# Patient Record
Sex: FEMALE | Employment: UNEMPLOYED | URBAN - METROPOLITAN AREA
[De-identification: names, ages, dates, MRNs, and addresses within clinical notes are randomized per-mention and may not be internally consistent; named-entity substitution may affect disease eponyms.]

---

## 2020-09-16 NOTE — PROGRESS NOTES
Date of Service: 9/23/2020    Chief Complaint   Patient presents with     Consult     BL foot pain and burning         HPI: Donna is a 60 year old female who presents today for further evaluation of BL foot pain.  Nature: sharp/burning/numbness/tingling    Location: top and bottom of both feet.     Duration: months    Onset: gradual. No inciting trauma.     Course: waxes and wanes.     Aggravating/alleviating factors: Some positions can cause pain in the feet.  She relates that lying down does cause pain.    Previous Treatments: None.  She relates that in 2014, she was diagnosed with diabetes.  She relates that she start on metformin, but stopped this a few years ago.  She relates that her  is a type I diabetic and takes his blood sugar often.  She relates that she tested hers about a year ago, and noticed it was high.  She has talked her primary doctor about getting back on the metformin, which is happened.    Review of Systems: No nausea, vomiting, diarrhea, fever, chills, night sweats, shortness of breath, chest pain.    PMH: No past medical history on file.    PSxH: No past surgical history on file.    Allergies: Patient has no allergy information on record.    SH:   Social History     Socioeconomic History     Marital status:      Spouse name: Not on file     Number of children: Not on file     Years of education: Not on file     Highest education level: Not on file   Occupational History     Not on file   Social Needs     Financial resource strain: Not on file     Food insecurity     Worry: Not on file     Inability: Not on file     Transportation needs     Medical: Not on file     Non-medical: Not on file   Tobacco Use     Smoking status: Not on file   Substance and Sexual Activity     Alcohol use: Not on file     Drug use: Not on file     Sexual activity: Not on file   Lifestyle     Physical activity     Days per week: Not on file     Minutes per session: Not on file     Stress: Not on file    Relationships     Social connections     Talks on phone: Not on file     Gets together: Not on file     Attends Muslim service: Not on file     Active member of club or organization: Not on file     Attends meetings of clubs or organizations: Not on file     Relationship status: Not on file     Intimate partner violence     Fear of current or ex partner: Not on file     Emotionally abused: Not on file     Physically abused: Not on file     Forced sexual activity: Not on file   Other Topics Concern     Not on file   Social History Narrative     Not on file       FH: No family history on file.    Objective:  Data Unavailable Data Unavailable Data Unavailable Data Unavailable Data Unavailable 0 lbs 0 oz    PT and DP pulses are 1/4 bilaterally. CRT is within normal limits.  Diminished pedal hair.   Gross sensation is intact bilaterally.  Protective sensation is intact as demonstrated with a 5.07G monofilament.  Equinus is noted bilaterally. No pain with active or passive ROM of the ankle, MTJ, 1st ray, or halluces bilaterally,.  Severe pes planus noted bilaterally.  Positive too many toe signs noted.  Calcanei are everted bilaterally.  No areas of pressure noted to the plantar foot.  No pain noted with palpation of the foot globally.  No pain with active or passive range of motion of the feet.  Muscle testing is 5/5.  Negative straight leg test on the left.  Positive straight leg test with tingling in the hallux on the right.  Negative Tinel's of  sign with percussion of the tibial and common peroneal nerves bilaterally.  Nails normal bilaterally. No open lesions are noted.     bilateral foot xrays indicated in 6 weightbearing views.    There appears to be some cortical thickening and small areas on the medial aspect of the left second metatarsal.  This could be from increased stress in the area.  Atavistic navicular noted bilaterally.      Assessment: Donna is a 59 YO with DM2 with paresthesia in BL feet. I  think this is more like related to radiculopathy, as she does have a +SLT on the right and lumbar spine changes. I would recommend treatment for the radiculopathy initially and see how she does. If no or minimal improvement, I would recommend an EMG.     Plan:  - Pt seen and evaluated.  - XRs taken and discussed with her.  - Order for PT for the paresthesia.   - Referral to sports med for the spine.  - See again in 6 weeks.

## 2020-09-23 ENCOUNTER — ANCILLARY PROCEDURE (OUTPATIENT)
Dept: GENERAL RADIOLOGY | Facility: CLINIC | Age: 60
End: 2020-09-23
Attending: PODIATRIST
Payer: COMMERCIAL

## 2020-09-23 ENCOUNTER — OFFICE VISIT (OUTPATIENT)
Dept: PODIATRY | Facility: CLINIC | Age: 60
End: 2020-09-23
Payer: COMMERCIAL

## 2020-09-23 VITALS — SYSTOLIC BLOOD PRESSURE: 123 MMHG | DIASTOLIC BLOOD PRESSURE: 84 MMHG | WEIGHT: 165.57 LBS | HEART RATE: 98 BPM

## 2020-09-23 DIAGNOSIS — R20.2 PARESTHESIA OF FOOT, BILATERAL: ICD-10-CM

## 2020-09-23 DIAGNOSIS — R20.2 PARESTHESIA OF FOOT, BILATERAL: Primary | ICD-10-CM

## 2020-09-23 PROCEDURE — 72100 X-RAY EXAM L-S SPINE 2/3 VWS: CPT | Performed by: RADIOLOGY

## 2020-09-23 PROCEDURE — 99203 OFFICE O/P NEW LOW 30 MIN: CPT | Performed by: PODIATRIST

## 2020-09-23 PROCEDURE — 73630 X-RAY EXAM OF FOOT: CPT | Mod: RT | Performed by: RADIOLOGY

## 2020-09-23 NOTE — PATIENT INSTRUCTIONS
Thanks for coming today.  Ortho/Sports Medicine Clinic  [address] [place] [address]    To schedule future appointments in Ortho Clinic, you may call [phone].    To schedule ordered imaging by your provider:   Call Central Imaging [phone]    To schedule an injection ordered by your provider:  Call Central Imaging Injection scheduling line: [phone]  [facility] available online at:  [email or web address]    Please call if any further questions or concerns ([phone]).  Clinic hours 8 am to 5 pm.    Return to clinic (call) if symptoms worsen or fail to improve.

## 2020-09-23 NOTE — LETTER
9/23/2020         RE: Donna Rubi  [address]  [facility] [address]        Dear Colleague,    Thank you for referring your patient, Donna Rubi, to the [facility]. Please see a copy of my visit note below.    Date of Service: 9/23/2020    Chief Complaint   Patient presents with     Consult     BL foot pain and burning         HPI: Donna is a 60 year old female who presents today for further evaluation of BL foot pain.  Nature: sharp/burning/numbness/tingling    Location: top and bottom of both feet.     Duration: months    Onset: gradual. No inciting trauma.     Course: waxes and wanes.     Aggravating/alleviating factors: Some positions can cause pain in the feet.  She relates that lying down does cause pain.    Previous Treatments: None.  She relates that in 2014, she was diagnosed with diabetes.  She relates that she start on metformin, but stopped this a few years ago.  She relates that her  is a type I diabetic and takes his blood sugar often.  She relates that she tested hers about a year ago, and noticed it was high.  She has talked her primary doctor about getting back on the metformin, which is happened.    Review of Systems: No nausea, vomiting, diarrhea, fever, chills, night sweats, shortness of breath, chest pain.    PMH: No past medical history on file.    PSxH: No past surgical history on file.    Allergies: Patient has no allergy information on record.    SH:   Social History     Socioeconomic History     Marital status:      Spouse name: Not on file     Number of children: Not on file     Years of education: Not on file     Highest education level: Not on file   Occupational History     Not on file   Social Needs     Financial resource strain: Not on file     Food insecurity     Worry: Not on file     Inability: Not on file     Transportation needs     Medical: Not on file     Non-medical: Not on file   Tobacco Use     Smoking status: Not on file    Substance and Sexual Activity     Alcohol use: Not on file     Drug use: Not on file     Sexual activity: Not on file   Lifestyle     Physical activity     Days per week: Not on file     Minutes per session: Not on file     Stress: Not on file   Relationships     Social connections     Talks on phone: Not on file     Gets together: Not on file     Attends Sabianism service: Not on file     Active member of club or organization: Not on file     Attends meetings of clubs or organizations: Not on file     Relationship status: Not on file     Intimate partner violence     Fear of current or ex partner: Not on file     Emotionally abused: Not on file     Physically abused: Not on file     Forced sexual activity: Not on file   Other Topics Concern     Not on file   Social History Narrative     Not on file       FH: No family history on file.    Objective:  Data Unavailable Data Unavailable Data Unavailable Data Unavailable Data Unavailable 0 lbs 0 oz    PT and DP pulses are 1/4 bilaterally. CRT is within normal limits.  Diminished pedal hair.   Gross sensation is intact bilaterally.  Protective sensation is intact as demonstrated with a 5.07G monofilament.  Equinus is noted bilaterally. No pain with active or passive ROM of the ankle, MTJ, 1st ray, or halluces bilaterally,.  Severe pes planus noted bilaterally.  Positive too many toe signs noted.  Calcanei are everted bilaterally.  No areas of pressure noted to the plantar foot.  No pain noted with palpation of the foot globally.  No pain with active or passive range of motion of the feet.  Muscle testing is 5/5.  Negative straight leg test on the left.  Positive straight leg test with tingling in the hallux on the right.  Negative Tinel's of  sign with percussion of the tibial and common peroneal nerves bilaterally.  Nails normal bilaterally. No open lesions are noted.     bilateral foot xrays indicated in 6 weightbearing views.    There appears to be some  cortical thickening and small areas on the medial aspect of the left second metatarsal.  This could be from increased stress in the area.  Atavistic navicular noted bilaterally.      Assessment: Donna is a 59 YO with DM2 with paresthesia in BL feet. I think this is more like related to radiculopathy, as she does have a +SLT on the right and lumbar spine changes. I would recommend treatment for the radiculopathy initially and see how she does. If no or minimal improvement, I would recommend an EMG.     Plan:  - Pt seen and evaluated.  - XRs taken and discussed with her.  - Order for PT for the paresthesia.   - Referral to sports med for the spine.  - See again in 6 weeks.              Again, thank you for allowing me to participate in the care of your patient.        Sincerely,        Gaurav Tamez DPM

## 2020-09-23 NOTE — NURSING NOTE
Donna Rubi's goals for this visit include: Consult pain on balls of feet    She requests these members of her care team be copied on today's visit information: yes    PCP: No Ref-Primary, Physician - Patient in process of finding a PCP    Referring Provider:  No referring provider defined for this encounter.    /84   Pulse 98   Wt 75.1 kg (165 lb 9.1 oz)

## 2020-09-30 ENCOUNTER — OFFICE VISIT (OUTPATIENT)
Dept: ORTHOPEDICS | Facility: CLINIC | Age: 60
End: 2020-09-30
Attending: PODIATRIST
Payer: COMMERCIAL

## 2020-09-30 VITALS
WEIGHT: 165.2 LBS | SYSTOLIC BLOOD PRESSURE: 115 MMHG | HEIGHT: 67 IN | DIASTOLIC BLOOD PRESSURE: 72 MMHG | OXYGEN SATURATION: 98 % | HEART RATE: 87 BPM | BODY MASS INDEX: 25.93 KG/M2

## 2020-09-30 DIAGNOSIS — M51.369 DDD (DEGENERATIVE DISC DISEASE), LUMBAR: Primary | ICD-10-CM

## 2020-09-30 DIAGNOSIS — R20.2 PARESTHESIA OF FOOT, BILATERAL: ICD-10-CM

## 2020-09-30 DIAGNOSIS — M54.16 LUMBAR RADICULAR PAIN: ICD-10-CM

## 2020-09-30 PROCEDURE — 99203 OFFICE O/P NEW LOW 30 MIN: CPT | Performed by: PREVENTIVE MEDICINE

## 2020-09-30 RX ORDER — DICLOFENAC SODIUM 75 MG/1
75 TABLET, DELAYED RELEASE ORAL 2 TIMES DAILY PRN
Qty: 40 TABLET | Refills: 1 | Status: SHIPPED | OUTPATIENT
Start: 2020-09-30

## 2020-09-30 ASSESSMENT — MIFFLIN-ST. JEOR: SCORE: 1345.84

## 2020-09-30 ASSESSMENT — PAIN SCALES - GENERAL: PAINLEVEL: MILD PAIN (3)

## 2020-09-30 NOTE — PROGRESS NOTES
HISTORY OF PRESENT ILLNESS  Ms. Rubi is a pleasant 60 year old year old female who presents to clinic today with bilateral leg pain, minor low back pain, bilateral foot pain for the past year, on/off and then moreso over past 2 months  Was seen by podiatry last week for foot pain and suspected lumbar radiculopathy  Donna explains that she has some pain today in her feet  Location: low back, bilateral legs/feet  Quality:  achy pain    Severity: 3/10 at worst    Duration: see above  Timing: occurs intermittently  Context: occurs while exercising and lifting  Modifying factors:  resting and non-use makes it better, movement and use makes it worse  Associated signs & symptoms: radiation down legs    MEDICAL HISTORY  There is no problem list on file for this patient.      Current Outpatient Medications   Medication Sig Dispense Refill     metFORMIN (GLUCOPHAGE) 500 MG tablet Take 500 mg by mouth 2 times daily (with meals)         No Known Allergies    No family history on file.  Social History     Socioeconomic History     Marital status:      Spouse name: Not on file     Number of children: Not on file     Years of education: Not on file     Highest education level: Not on file   Occupational History     Not on file   Social Needs     Financial resource strain: Not on file     Food insecurity     Worry: Not on file     Inability: Not on file     Transportation needs     Medical: Not on file     Non-medical: Not on file   Tobacco Use     Smoking status: Never Smoker     Smokeless tobacco: Never Used   Substance and Sexual Activity     Alcohol use: Not on file     Drug use: Not on file     Sexual activity: Not on file   Lifestyle     Physical activity     Days per week: Not on file     Minutes per session: Not on file     Stress: Not on file   Relationships     Social connections     Talks on phone: Not on file     Gets together: Not on file     Attends Mu-ism service: Not on file     Active member of club or  "organization: Not on file     Attends meetings of clubs or organizations: Not on file     Relationship status: Not on file     Intimate partner violence     Fear of current or ex partner: Not on file     Emotionally abused: Not on file     Physically abused: Not on file     Forced sexual activity: Not on file   Other Topics Concern     Not on file   Social History Narrative     Not on file       Additional medical/Social/Surgical histories reviewed in [facility] and updated as appropriate.     REVIEW OF SYSTEMS (9/30/2020)  10 point ROS of systems including Constitutional, Eyes, Respiratory, Cardiovascular, Gastroenterology, Genitourinary, Integumentary, Musculoskeletal, Psychiatric, Allergic/Immunologic were all negative except for pertinent positives noted in my HPI.     PHYSICAL EXAM  Vitals:    09/30/20 0819   BP: 115/72   BP Location: Left arm   Patient Position: Sitting   Cuff Size: Adult Regular   Pulse: 87   SpO2: 98%   Weight: 74.9 kg (165 lb 3.2 oz)   Height: 1.7 m (5' 6.93\")     Vital Signs: /72 (BP Location: Left arm, Patient Position: Sitting, Cuff Size: Adult Regular)   Pulse 87   Ht 1.7 m (5' 6.93\")   Wt 74.9 kg (165 lb 3.2 oz)   SpO2 98%   BMI 25.93 kg/m   Patient declined being weighed. Body mass index is 25.93 kg/m .    General  - normal appearance, in no obvious distress  HEENT  - conjunctivae not injected, moist mucous membranes, normocephalic/atraumatic head, ears normal appearance, no lesions, mouth normal appearance, no scars, normal dentition and teeth present  CV  - normal peripheral perfusion  Pulm  - normal respiratory pattern, non-labored  Musculoskeletal - lumbar spine  - stance: normal gait without limp, no obvious leg length discrepancy, normal heel and toe walk  - inspection: normal bone and joint alignment, no obvious scoliosis  - palpation: no paravertebral or bony tenderness  - ROM: flexion exacerbates some pain, normal extension, sidebending, rotation  - strength: lower " extremities 5/5 in all planes  - special tests:  (+) straight leg raise  (+) slump test  Neuro  - patellar and Achilles DTRs 2+ bilaterally, bilateral lower extremity sensory deficit throughout L5 distribution, grossly normal coordination, normal muscle tone  Skin  - no ecchymosis, erythema, warmth, or induration, no obvious rash  Psych  - interactive, appropriate, normal mood and affect  ASSESSMENT & PLAN  61 yo female with lumbar ddd, radicular pain, bilateral foot pain  Reviewed lumbar xray: shows ddd  Lumbar MRI consider  Consider DEBORA  Start PT  Start voltaren and tizandine  F/u 3 weeks    Appropriate PPE was utilized for prevention of spread of Covid-19.  Jeffrey Dubois MD, CAQSM

## 2020-09-30 NOTE — PATIENT INSTRUCTIONS
Thanks for coming today.  Ortho/Sports Medicine Clinic  [address] [place] [address]    To schedule future appointments in Ortho Clinic, you may call [phone].    To schedule ordered imaging by your provider:   Call Central Imaging [phone]    To schedule an injection ordered by your provider:  Call Central Imaging Injection scheduling line: [phone]  [facility] available online at:  [email or web address]    Please call if any further questions or concerns ([phone]).  Clinic hours 8 am to 5 pm.    Return to clinic (call) if symptoms worsen or fail to improve.

## 2020-09-30 NOTE — NURSING NOTE
"Donna Rubi's chief complaint for this visit includes:  Chief Complaint   Patient presents with     New Patient     Paresthesia of foot, bilateral referred by Dr. Tamez     PCP: No Ref-Primary, Physician    Referring Provider:  Gaurav Tamez DPM  [address] [place] [address]    /72 (BP Location: Left arm, Patient Position: Sitting, Cuff Size: Adult Regular)   Pulse 87   Ht 1.7 m (5' 6.93\")   Wt 74.9 kg (165 lb 3.2 oz)   SpO2 98%   BMI 25.93 kg/m    Mild Pain (3)     Do you need any medication refills at today's visit? No      "

## 2020-09-30 NOTE — LETTER
9/30/2020         RE: Donna Rubi  [address]  [facility] [address]        Dear Colleague,    Thank you for referring your patient, Donna Rubi, to the [facility]. Please see a copy of my visit note below.    HISTORY OF PRESENT ILLNESS  Ms. Rubi is a pleasant 60 year old year old female who presents to clinic today with bilateral leg pain, minor low back pain, bilateral foot pain for the past year, on/off and then moreso over past 2 months  Was seen by podiatry last week for foot pain and suspected lumbar radiculopathy  Donna explains that she has some pain today in her feet  Location: low back, bilateral legs/feet  Quality:  achy pain    Severity: 3/10 at worst    Duration: see above  Timing: occurs intermittently  Context: occurs while exercising and lifting  Modifying factors:  resting and non-use makes it better, movement and use makes it worse  Associated signs & symptoms: radiation down legs    MEDICAL HISTORY  There is no problem list on file for this patient.      Current Outpatient Medications   Medication Sig Dispense Refill     metFORMIN (GLUCOPHAGE) 500 MG tablet Take 500 mg by mouth 2 times daily (with meals)         No Known Allergies    No family history on file.  Social History     Socioeconomic History     Marital status:      Spouse name: Not on file     Number of children: Not on file     Years of education: Not on file     Highest education level: Not on file   Occupational History     Not on file   Social Needs     Financial resource strain: Not on file     Food insecurity     Worry: Not on file     Inability: Not on file     Transportation needs     Medical: Not on file     Non-medical: Not on file   Tobacco Use     Smoking status: Never Smoker     Smokeless tobacco: Never Used   Substance and Sexual Activity     Alcohol use: Not on file     Drug use: Not on file     Sexual activity: Not on file   Lifestyle     Physical activity     Days per week:  "Not on file     Minutes per session: Not on file     Stress: Not on file   Relationships     Social connections     Talks on phone: Not on file     Gets together: Not on file     Attends Latter day service: Not on file     Active member of club or organization: Not on file     Attends meetings of clubs or organizations: Not on file     Relationship status: Not on file     Intimate partner violence     Fear of current or ex partner: Not on file     Emotionally abused: Not on file     Physically abused: Not on file     Forced sexual activity: Not on file   Other Topics Concern     Not on file   Social History Narrative     Not on file       Additional medical/Social/Surgical histories reviewed in [facility] and updated as appropriate.     REVIEW OF SYSTEMS (9/30/2020)  10 point ROS of systems including Constitutional, Eyes, Respiratory, Cardiovascular, Gastroenterology, Genitourinary, Integumentary, Musculoskeletal, Psychiatric, Allergic/Immunologic were all negative except for pertinent positives noted in my HPI.     PHYSICAL EXAM  Vitals:    09/30/20 0819   BP: 115/72   BP Location: Left arm   Patient Position: Sitting   Cuff Size: Adult Regular   Pulse: 87   SpO2: 98%   Weight: 74.9 kg (165 lb 3.2 oz)   Height: 1.7 m (5' 6.93\")     Vital Signs: /72 (BP Location: Left arm, Patient Position: Sitting, Cuff Size: Adult Regular)   Pulse 87   Ht 1.7 m (5' 6.93\")   Wt 74.9 kg (165 lb 3.2 oz)   SpO2 98%   BMI 25.93 kg/m   Patient declined being weighed. Body mass index is 25.93 kg/m .    General  - normal appearance, in no obvious distress  HEENT  - conjunctivae not injected, moist mucous membranes, normocephalic/atraumatic head, ears normal appearance, no lesions, mouth normal appearance, no scars, normal dentition and teeth present  CV  - normal peripheral perfusion  Pulm  - normal respiratory pattern, non-labored  Musculoskeletal - lumbar spine  - stance: normal gait without limp, no obvious leg length discrepancy, " normal heel and toe walk  - inspection: normal bone and joint alignment, no obvious scoliosis  - palpation: no paravertebral or bony tenderness  - ROM: flexion exacerbates some pain, normal extension, sidebending, rotation  - strength: lower extremities 5/5 in all planes  - special tests:  (+) straight leg raise  (+) slump test  Neuro  - patellar and Achilles DTRs 2+ bilaterally, bilateral lower extremity sensory deficit throughout L5 distribution, grossly normal coordination, normal muscle tone  Skin  - no ecchymosis, erythema, warmth, or induration, no obvious rash  Psych  - interactive, appropriate, normal mood and affect  ASSESSMENT & PLAN  59 yo female with lumbar ddd, radicular pain, bilateral foot pain  Reviewed lumbar xray: shows ddd  Lumbar MRI consider  Consider DEBORA  Start PT  Start voltaren and tizandine  F/u 3 weeks    Appropriate PPE was utilized for prevention of spread of Covid-19.  Jeffrey Dubois MD, CAQSM      Again, thank you for allowing me to participate in the care of your patient.        Sincerely,        Jeffrey Dubois MD

## 2020-10-02 ENCOUNTER — THERAPY VISIT (OUTPATIENT)
Dept: PHYSICAL THERAPY | Facility: CLINIC | Age: 60
End: 2020-10-02
Attending: PODIATRIST
Payer: COMMERCIAL

## 2020-10-02 DIAGNOSIS — M79.671 BILATERAL FOOT PAIN: ICD-10-CM

## 2020-10-02 DIAGNOSIS — R20.2 PARESTHESIA OF FOOT, BILATERAL: ICD-10-CM

## 2020-10-02 DIAGNOSIS — M79.672 BILATERAL FOOT PAIN: ICD-10-CM

## 2020-10-02 PROCEDURE — 97110 THERAPEUTIC EXERCISES: CPT | Mod: GP | Performed by: PHYSICAL THERAPIST

## 2020-10-02 PROCEDURE — 97161 PT EVAL LOW COMPLEX 20 MIN: CPT | Mod: GP | Performed by: PHYSICAL THERAPIST

## 2020-10-02 NOTE — PROGRESS NOTES
[facility] Initial Evaluation  Subjective:  The history is provided by the patient. No  was used.   Therapist Generated HPI Evaluation  Problem details: Patient reports she started to get numbness in both feet and does have a history of diabetes. Does feel the numbness has improved recently. After x-rays her doctors are thinking its potentially more related to low back. .         Type of problem:  Lumbar.    This is a chronic (9/23/20) condition.  Condition occurred with:  Insidious onset.    Site of Pain: medial lower leg.    Pain radiates to:  Foot left and foot right.     Associated symptoms:  Numbness and tingling. Exacerbated by: lying down at night, wearing shoes.  and relieved by muscle relaxants.          Patient Health History  Donna Rubi being seen for bilateral foot pain / numbness.     Problem began: 9/23/2020.   Problem occurred: unknown   Pain is reported as 3/10 on pain scale.  General health as reported by patient is good.  Pertinent medical history includes: diabetes.   Red flags:  None as reported by patient.  Medical allergies: none.   Surgeries include:  None.    Current medications:  Muscle relaxants and pain medication.    Current occupation is .   Primary job tasks include:  Computer work and prolonged sitting.                                    Objective:  Standing Alignment:                Ankle/Foot:  Pes planus L                   Lumbar/SI Evaluation    Lumbar Myotomes:  normal (negative heel walking / toe walking)                Lumbar Dermtomes:        L2 Left:  Normal-light touch     L2 Right:  Normal-light touch  L3 Left:  Normal-light touch     L3 Right:  Normal-light touch  L4 Left:  Normal-light touch       L4 Right:  Normal-light touch  L5 Left:  Normal-light touch     L5 Right:  Normal-light touch  S1 Left:  Hyper-light touch     S1 Right:  Hyper-light touch  Neural Tension/Mobility:    Left side:  SLR positive.  Left  side:Slump  negative.   Right side:   SLR positive.  Right side:   Slump  negative.                                                          [place] Lumbar Evaluation      Movement Loss:  Flexion (Flex): nil  Extension (EXT): mod  Side Glide R (SG R): nil  Side Glide L (SG L): nil  Test Movements:  FIS: Pretest Movements: 0/10 PL.         EIL: During: no effect  After: no effect    Repeat EIL: During: decreases  After: no better        Static Tests:          Lying Prone in Extension: 2-3 min during NE / NE.                                              ROS    Assessment/Plan:    Patient is a 60 year old female with lumbar and both sides ankle complaints.    Patient has the following significant findings with corresponding treatment plan.                Diagnosis 1:  Bilateral foot numbness / pain with potential lumbar radiculopathy  Pain -  hot/cold therapy, mechanical traction, manual therapy, self management, education, directional preference exercise and home program  Decreased ROM/flexibility - manual therapy, therapeutic exercise, therapeutic activity and home program  Decreased function - therapeutic activities and home program  Impaired posture - neuro re-education, therapeutic activities and home program    Therapy Evaluation Codes:   1) History comprised of:   Personal factors that impact the plan of care:      None.    Comorbidity factors that impact the plan of care are:      Diabetes.     Medications impacting care: Muscle relaxant and Pain.  2) Examination of Body Systems comprised of:   Body structures and functions that impact the plan of care:      Ankle and Lumbar spine.   Activity limitations that impact the plan of care are:      Sleeping and Laying down.  3) Clinical presentation characteristics are:   Stable/Uncomplicated.  4) Decision-Making    Low complexity using standardized patient assessment instrument and/or measureable assessment of functional outcome.  Cumulative Therapy Evaluation is:  Low complexity.    Previous and current functional limitations:  (See Goal Flow Sheet for this information)    Short term and Long term goals: (See Goal Flow Sheet for this information)     Communication ability:  Patient appears to be able to clearly communicate and understand verbal and written communication and follow directions correctly.  Treatment Explanation - The following has been discussed with the patient:   RX ordered/plan of care  Anticipated outcomes  Possible risks and side effects  This patient would benefit from PT intervention to resume normal activities.   Rehab potential is good.    Frequency:  1 X week, once daily  Duration:  for 6 weeks  Discharge Plan:  Achieve all LTG.  Independent in home treatment program.  Reach maximal therapeutic benefit.    Please refer to the daily flowsheet for treatment today, total treatment time and time spent performing 1:1 timed codes.

## 2020-11-05 DIAGNOSIS — M54.16 LUMBAR RADICULAR PAIN: ICD-10-CM

## 2020-11-05 DIAGNOSIS — M51.369 DDD (DEGENERATIVE DISC DISEASE), LUMBAR: ICD-10-CM

## 2020-11-10 PROBLEM — M79.671 BILATERAL FOOT PAIN: Status: RESOLVED | Noted: 2020-10-02 | Resolved: 2020-11-10

## 2020-11-10 PROBLEM — M79.672 BILATERAL FOOT PAIN: Status: RESOLVED | Noted: 2020-10-02 | Resolved: 2020-11-10
